# Patient Record
Sex: FEMALE | Race: WHITE | Employment: FULL TIME | URBAN - METROPOLITAN AREA
[De-identification: names, ages, dates, MRNs, and addresses within clinical notes are randomized per-mention and may not be internally consistent; named-entity substitution may affect disease eponyms.]

---

## 2018-08-27 ENCOUNTER — OFFICE VISIT (OUTPATIENT)
Dept: INTERNAL MEDICINE CLINIC | Facility: CLINIC | Age: 23
End: 2018-08-27

## 2018-08-27 VITALS
BODY MASS INDEX: 18.93 KG/M2 | SYSTOLIC BLOOD PRESSURE: 109 MMHG | OXYGEN SATURATION: 98 % | WEIGHT: 124.9 LBS | TEMPERATURE: 98.3 F | HEART RATE: 68 BPM | HEIGHT: 68 IN | RESPIRATION RATE: 17 BRPM | DIASTOLIC BLOOD PRESSURE: 66 MMHG

## 2018-08-27 DIAGNOSIS — L29.8 CHRONIC PRURITIC RASH IN ADULT: Primary | ICD-10-CM

## 2018-08-27 DIAGNOSIS — M79.671 PAIN IN BOTH FEET: ICD-10-CM

## 2018-08-27 DIAGNOSIS — D22.9 BENIGN MOLE: ICD-10-CM

## 2018-08-27 DIAGNOSIS — M79.672 PAIN IN BOTH FEET: ICD-10-CM

## 2018-08-27 NOTE — PROGRESS NOTES
Health Maintenance Due   Topic Date Due    HPV Age 9Y-34Y (1 of 1 - Female 3 Dose Series) 10/09/2004    DTaP/Tdap/Td series (1 - Tdap) 10/09/2014    PAP AKA CERVICAL CYTOLOGY  10/09/2014    Influenza Age 5 to Adult  08/01/2018       Chief Complaint   Patient presents with   [place] [place] Establish Care    Insect Bite     x 1 year    Foot Pain       1. Have you been to the ER, urgent care clinic since your last visit? Hospitalized since your last visit? No    2. Have you seen or consulted any other health care providers outside of the [address] [place] [address] since your last visit? Include any pap smears or colon screening. No    3) Do you have an Advance Directive on file? no    4) Are you interested in receiving information on Advance Directives? NO      Patient is accompanied by self I have received verbal consent from Lolly Wallace to discuss any/all medical information while they are present in the room.

## 2018-08-27 NOTE — PROGRESS NOTES
Subjective:      Princess Dexter is a 23 y.o. female who is a new patient and is here to establish care and concerns. The following sections were reviewed & updated as appropriate: PMH, PL, PSH, FH, RxH, and SH. Insect bite: noted over a year ago, she would get \"rows of red bumps\" that will be in random places. Symptoms would last a week. She states her  does not get bites. She has tried taking benadryl and states it doesn't help. She will not get symptoms when she is traveling. Symptoms will also go a month at a time without new ones. 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 She notes that she has pain every time she wears them and then pain resolves when she takes them off. She has worn Haix in the past.     There are no active problems to display for this patient. No Known Allergies  Past Medical History:   Diagnosis Date    Anemia      Past Surgical History:   Procedure Laterality Date    HX SHOULDER ARTHROSCOPY          Review of Systems    A comprehensive review of systems was negative except for that written in the HPI. Objective:     Visit Vitals    /66 (BP 1 Location: Left arm, BP Patient Position: Sitting)    Pulse 68    Temp 98.3 °F (36.8 °C) (Oral)    Resp 17    Ht 5' 8\" (1.727 m)    Wt 124 lb 14.4 oz (56.7 kg)    SpO2 98%    BMI 18.99 kg/m2     General appearance: alert, cooperative, no distress, appears stated age  Head: Normocephalic, without obvious abnormality, atraumatic  Eyes: negative  Lungs: clear to auscultation bilaterally  Heart: regular rate and rhythm, S1, S2 normal, no murmur, click, rub or gallop  Extremities: extremities normal, atraumatic, no cyanosis or edema  Pulses: 2+ and symmetric  Skin: Skin color, texture, turgor normal. No rashes or lesions  Neurologic: Grossly normal  Nursing note and vitals reviewed  Assessment/Plan:       ICD-10-CM ICD-9-CM    1.  Chronic pruritic rash in adult L29.8 698.8 REFERRAL TO DERMATOLOGY   2. Pain in both feet M79.671 729.5     M79.672     3. Benign mole D22.9 216.9    She will trial zyrtec for 2 weeks to see if there is improvement. Referral placed for derm to do skin scrapping. Letter written for her work so she can get new shoes. Advised her to call back or return to office if symptoms worsen/change/persist.  Discussed expected course/resolution/complications of diagnosis in detail with patient. Medication risks/benefits/costs/interactions/alternatives discussed with patient. She was given an after visit summary which includes diagnoses, current medications, & vitals. She expressed understanding with the diagnosis and plan.

## 2018-08-27 NOTE — LETTER
NOTIFICATION TO WORK 
 
8/27/2018 10:15 AM 
 
Ms. Yaritza Jones [address] [id number] To Whom It May Concern: 
 
Yaritza Jones is currently under the care of Sunny Olivarez. She is unable to wear the uniform shoes provided for her as it gives her bilateral foot pain. She has worn Haix in the past and this has helped symptoms. I recommend that she change shoes to the Haix as it would be medically necessary to improve chronic foot pain. If there are questions or concerns please have the patient contact our office.  
 
 
 
Sincerely, 
 
 
Madeline Bailon NP

## 2018-08-27 NOTE — MR AVS SNAPSHOT
[address] [place] [address] 
[phone] Patient: Aren Hodge MRN: [id number] :1995 Visit Information Date & Time Provider Department Dept. Phone Encounter #  
 2018  9:45 AM Kenney Espinoza, [address] [place] [address] Internal Medicine [phone] [id number] Follow-up Instructions Return if symptoms worsen or fail to improve, for Schedule your annual physical with fasting labs. Upcoming Health Maintenance Date Due  
 HPV Age 9Y-34Y (1 of 3 - Female 3 Dose Series) 10/9/2004 DTaP/Tdap/Td series (1 - Tdap) 10/9/2014 PAP AKA CERVICAL CYTOLOGY 10/9/2014 Influenza Age 5 to Adult 2018 Allergies as of 2018  Review Complete On: 2018 By: Kenney Espinoza NP No Known Allergies Current Immunizations  Never Reviewed No immunizations on file. Not reviewed this visit You Were Diagnosed With   
  
 Codes Comments Chronic pruritic rash in adult    -  Primary ICD-10-CM: L29.8 ICD-9-CM: 698.8 Pain in both feet     ICD-10-CM: M79.671, M39.602 ICD-9-CM: 729.5 Vitals BP Pulse Temp Resp Height(growth percentile) Weight(growth percentile) 109/66 (BP 1 Location: Left arm, BP Patient Position: Sitting) 68 98.3 °F (36.8 °C) (Oral) 17 5' 8\" (1.727 m) 124 lb 14.4 oz (56.7 kg) SpO2 BMI Smoking Status 98% 18.99 kg/m2 Never Smoker Vitals History BMI and BSA Data Body Mass Index Body Surface Area  
 18.99 kg/m 2 1.65 m 2 Preferred Pharmacy Pharmacy Name Phone [id number] [place], [place] - [address] [place] [address] [phone] Your Updated Medication List  
  
   
This list is accurate as of 18 10:23 AM.  Always use your most recent med list.  
  
  
  
  
 INTRAUTERINE DEVICE (IUD) IU  
by IntraUTERine route. We Performed the Following REFERRAL TO DERMATOLOGY [REF19 Custom] Follow-up Instructions Return if symptoms worsen or fail to improve, for Schedule your annual physical with fasting labs. Referral Information Referral ID Referred By Referred To  
  
 [id number] SKIFF, Maurilio Glee, NP   
   [address] [place], [address] Phone: [phone] Fax: [phone] Visits Status Start Date End Date 1 New Request 8/27/18 8/27/19 If your referral has a status of pending review or denied, additional information will be sent to support the outcome of this decision. Introducing [facility]! [employer] introduces [employer] patient portal. Now you can access parts of your medical record, email your doctor's office, and request medication refills online. 1. In your internet browser, go to [email or web address] 2. Click on the First Time User? Click Here link in the Sign In box. You will see the New Member Sign Up page. 3. Enter your [employer] Access Code exactly as it appears below. You will not need to use this code after youve completed the sign-up process. If you do not sign up before the expiration date, you must request a new code. · [employer] Access Code: [email or web address] Expires: 11/25/2018  9:39 AM 
 
4. Enter the last four digits of your Social Security Number (xxxx) and Date of Birth (mm/dd/yyyy) as indicated and click Submit. You will be taken to the next sign-up page. 5. Create a Bookeent ID. This will be your [employer] login ID and cannot be changed, so think of one that is secure and easy to remember. 6. Create a [employer] password. You can change your password at any time. 7. Enter your Password Reset Question and Answer. This can be used at a later time if you forget your password. 8. Enter your e-mail address. You will receive e-mail notification when new information is available in [address] 9. Click Sign Up. You can now view and download portions of your medical record. 10. Click the Download Summary menu link to download a portable copy of your medical information. If you have questions, please visit the Frequently Asked Questions section of the [employer] website. Remember, [employer] is NOT to be used for urgent needs. For medical emergencies, dial 911. Now available from your iPhone and Android! Please provide this summary of care documentation to your next provider. Your primary care clinician is listed as Colt Pratt. If you have any questions after today's visit, please call [phone].

## 2020-03-16 ENCOUNTER — OFFICE VISIT (OUTPATIENT)
Dept: INTERNAL MEDICINE CLINIC | Age: 25
End: 2020-03-16

## 2020-03-16 VITALS
BODY MASS INDEX: 19.04 KG/M2 | RESPIRATION RATE: 14 BRPM | SYSTOLIC BLOOD PRESSURE: 90 MMHG | DIASTOLIC BLOOD PRESSURE: 70 MMHG | WEIGHT: 125.6 LBS | HEART RATE: 61 BPM | TEMPERATURE: 98 F | HEIGHT: 68 IN | OXYGEN SATURATION: 98 %

## 2020-03-16 DIAGNOSIS — Z11.1 SCREENING-PULMONARY TB: ICD-10-CM

## 2020-03-16 DIAGNOSIS — Z00.00 ENCOUNTER FOR GENERAL ADULT MEDICAL EXAMINATION W/O ABNORMAL FINDINGS: Primary | ICD-10-CM

## 2020-03-16 DIAGNOSIS — I49.3 PVC (PREMATURE VENTRICULAR CONTRACTION): ICD-10-CM

## 2020-03-16 NOTE — PROGRESS NOTES
Subjective:      Janessa Harris is a 30 y.o. female who presents today for CPE. She needs forms completed for medical school. Immunization completed. Health Maintenance  Immunizations:    Influenza: up to date. Tetanus: up to date. Gardasil: up to date. Cancer screening:   Cervical: reviewed guidelines, UTD, done by OBGYN, records requested. 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 She ran an EKG on herself that showed PVCs, reviewed rhythm strip. She denies symptoms at this time. She specifically denies chest pain, SOB, dyspnea with exertion, peripheral edema. Patient Care Team:  Skiff, Colette Breed, NP as PCP - General (Nurse Practitioner)  Ariel Mueller NP as PCP - [facility] Provider     The following sections were reviewed & updated as appropriate: PMH, PSH, FH, and SH. Patient Active Problem List    Diagnosis Date Noted    Chronic pruritic rash in adult 08/27/2018     Current Outpatient Medications   Medication Sig Dispense Refill    INTRAUTERINE DEVICE, IUD, IU by IntraUTERine route. No Known Allergies  Past Medical History:   Diagnosis Date    Anemia      Past Surgical History:   Procedure Laterality Date    HX SHOULDER ARTHROSCOPY          Review of Systems    A comprehensive review of systems was negative except for that written in the HPI. Objective:     Visit Vitals  BP 90/70 (BP 1 Location: Left arm, BP Patient Position: Sitting)   Pulse 61   Temp 98 °F (36.7 °C) (Oral)   Resp 14   Ht 5' 8\" (1.727 m)   Wt 125 lb 9.6 oz (57 kg)   LMP 03/02/2020 (Approximate)   SpO2 98%   BMI 19.10 kg/m²     General:  Alert, cooperative, no distress, appears stated age. Head:  Normocephalic, without obvious abnormality, atraumatic. Eyes:  Conjunctivae/corneas clear. PERRL, EOMs intact   Ears:  Normal TMs and external ear canals both ears. Nose: Nares normal. Septum midline. Mucosa normal. No drainage or sinus tenderness. Throat: Lips, mucosa, and tongue normal. Teeth and gums normal.   Neck: Supple, symmetrical, trachea midline, no adenopathy, thyroid: no enlargement/tenderness/nodules, no carotid bruit and no JVD. Back:   Symmetric, no curvature. ROM normal. No CVA tenderness. Lungs:   Clear to auscultation bilaterally. Chest wall:  No tenderness or deformity. Heart:  Regular rate and rhythm, S1, S2 normal, no murmur, click, rub or gallop. Abdomen:   Soft, non-tender. Bowel sounds normal. No masses,  No organomegaly. Extremities: Extremities normal, atraumatic, no cyanosis or edema. Pulses: 2+ and symmetric all extremities. Skin: Skin color, texture, turgor normal. No rashes or lesions. Lymph nodes: Cervical, supraclavicular, and axillary nodes normal.   Neurologic: CNII-XII intact. Normal strength, sensation and reflexes throughout. Nursing note and vitals reviewed  Assessment/Plan:   1. Encounter for general adult medical examination w/o abnormal findings  - CBC WITH AUTOMATED DIFF  - LIPID PANEL  - METABOLIC PANEL, COMPREHENSIVE  - TSH 3RD GENERATION    2. PVC (premature ventricular contraction)  - REFERRAL TO CARDIOLOGY    3. Screening-pulmonary TB  - QUANTIFERON-TB GOLD PLUS            Advised her to call back or return to office if symptoms worsen/change/persist.  Discussed expected course/resolution/complications of diagnosis in detail with patient. Medication risks/benefits/costs/interactions/alternatives discussed with patient. She was given an after visit summary which includes diagnoses, current medications, & vitals. She expressed understanding with the diagnosis and plan.

## 2020-03-16 NOTE — PROGRESS NOTES
Chief Complaint   Patient presents with    Physical     Reviewed record in preparation for visit and have obtained necessary documentation. Identified pt with two pt identifiers(name and ). Health Maintenance Due   Topic    HPV Age 9Y-34Y (1 - 2-dose series)    DTaP/Tdap/Td series (1 - Tdap)    PAP AKA CERVICAL CYTOLOGY     Influenza Age 5 to Adult          Chief Complaint   Patient presents with    Physical        Wt Readings from Last 3 Encounters:   20 125 lb 9.6 oz (57 kg)   18 124 lb 14.4 oz (56.7 kg)     Temp Readings from Last 3 Encounters:   20 98 °F (36.7 °C) (Oral)   18 98.3 °F (36.8 °C) (Oral)     BP Readings from Last 3 Encounters:   20 90/70   18 109/66     Pulse Readings from Last 3 Encounters:   20 61   18 68           Learning Assessment:  :     Learning Assessment 2018   PRIMARY LEARNER Patient   PRIMARY LANGUAGE ENGLISH   LEARNER PREFERENCE PRIMARY DEMONSTRATION   ANSWERED BY Self   RELATIONSHIP SELF       Depression Screening:  :     3 most recent PHQ Screens 3/16/2020   Little interest or pleasure in doing things Not at all   Feeling down, depressed, irritable, or hopeless Not at all   Total Score PHQ 2 0       Fall Risk Assessment:  :     Fall Risk Assessment, last 12 mths 2018   Able to walk? Yes   Fall in past 12 months? No       Abuse Screening:  :     Abuse Screening Questionnaire 2018   Do you ever feel afraid of your partner? N   Are you in a relationship with someone who physically or mentally threatens you? N   Is it safe for you to go home?  Y       Coordination of Care Questionnaire:  :     1) Have you been to an emergency room, urgent care clinic since your last visit? no   Hospitalized since your last visit? no             2) Have you seen or consulted any other health care providers outside of [address] [place] [address] since your last visit? no  (Include any pap smears or colon screenings in this section.)    3) Do you have an Advance Directive on file? no    4) Are you interested in receiving information on Advance Directives? NO      Patient is accompanied by self I have received verbal consent from Mayela Berger to discuss any/all medical information while they are present in the room. Reviewed record  In preparation for visit and have obtained necessary documentation.

## 2020-03-18 LAB
ALBUMIN SERPL-MCNC: 4.8 G/DL (ref 3.9–5)
ALBUMIN/GLOB SERPL: 1.8 {RATIO} (ref 1.2–2.2)
ALP SERPL-CCNC: 58 IU/L (ref 39–117)
ALT SERPL-CCNC: 15 IU/L (ref 0–32)
AST SERPL-CCNC: 18 IU/L (ref 0–40)
BASOPHILS # BLD AUTO: 0.1 X10E3/UL (ref 0–0.2)
BASOPHILS NFR BLD AUTO: 1 %
BILIRUB SERPL-MCNC: 0.7 MG/DL (ref 0–1.2)
BUN SERPL-MCNC: 9 MG/DL (ref 6–20)
BUN/CREAT SERPL: 11 (ref 9–23)
CALCIUM SERPL-MCNC: 9.9 MG/DL (ref 8.7–10.2)
CHLORIDE SERPL-SCNC: 100 MMOL/L (ref 96–106)
CHOLEST SERPL-MCNC: 138 MG/DL (ref 100–199)
CO2 SERPL-SCNC: 24 MMOL/L (ref 20–29)
CREAT SERPL-MCNC: 0.81 MG/DL (ref 0.57–1)
EOSINOPHIL # BLD AUTO: 0.2 X10E3/UL (ref 0–0.4)
EOSINOPHIL NFR BLD AUTO: 2 %
ERYTHROCYTE [DISTWIDTH] IN BLOOD BY AUTOMATED COUNT: 12.7 % (ref 11.7–15.4)
GLOBULIN SER CALC-MCNC: 2.6 G/DL (ref 1.5–4.5)
GLUCOSE SERPL-MCNC: 87 MG/DL (ref 65–99)
HCT VFR BLD AUTO: 40.8 % (ref 34–46.6)
HDLC SERPL-MCNC: 45 MG/DL
HGB BLD-MCNC: 14.3 G/DL (ref 11.1–15.9)
IMM GRANULOCYTES # BLD AUTO: 0 X10E3/UL (ref 0–0.1)
IMM GRANULOCYTES NFR BLD AUTO: 0 %
LDLC SERPL CALC-MCNC: 81 MG/DL (ref 0–99)
LYMPHOCYTES # BLD AUTO: 3.4 X10E3/UL (ref 0.7–3.1)
LYMPHOCYTES NFR BLD AUTO: 37 %
MCH RBC QN AUTO: 29.5 PG (ref 26.6–33)
MCHC RBC AUTO-ENTMCNC: 35 G/DL (ref 31.5–35.7)
MCV RBC AUTO: 84 FL (ref 79–97)
MONOCYTES # BLD AUTO: 0.5 X10E3/UL (ref 0.1–0.9)
MONOCYTES NFR BLD AUTO: 6 %
NEUTROPHILS # BLD AUTO: 4.8 X10E3/UL (ref 1.4–7)
NEUTROPHILS NFR BLD AUTO: 54 %
PLATELET # BLD AUTO: 367 X10E3/UL (ref 150–450)
POTASSIUM SERPL-SCNC: 4.3 MMOL/L (ref 3.5–5.2)
PROT SERPL-MCNC: 7.4 G/DL (ref 6–8.5)
RBC # BLD AUTO: 4.85 X10E6/UL (ref 3.77–5.28)
SODIUM SERPL-SCNC: 140 MMOL/L (ref 134–144)
TRIGL SERPL-MCNC: 62 MG/DL (ref 0–149)
TSH SERPL DL<=0.005 MIU/L-ACNC: 0.66 UIU/ML (ref 0.45–4.5)
VLDLC SERPL CALC-MCNC: 12 MG/DL (ref 5–40)
WBC # BLD AUTO: 9 X10E3/UL (ref 3.4–10.8)

## 2020-03-18 NOTE — PROGRESS NOTES
All labs normal except slight elevation in lymphocytes. This is very mild and no further action needed at this time.  Thyroid testing was normal.

## 2020-03-19 DIAGNOSIS — R76.12 POSITIVE QUANTIFERON-TB GOLD TEST: Primary | ICD-10-CM

## 2020-03-19 LAB
GAMMA INTERFERON BACKGROUND BLD IA-ACNC: 0.66 IU/ML
M TB IFN-G BLD-IMP: POSITIVE
M TB IFN-G CD4+ BCKGRND COR BLD-ACNC: 1.04 IU/ML
MITOGEN IGNF BLD-ACNC: >10 IU/ML
QUANTIFERON INCUBATION, QF1T: ABNORMAL
QUANTIFERON TB2 AG: 0.74 IU/ML
SERVICE CMNT-IMP: ABNORMAL

## 2020-03-20 ENCOUNTER — TELEPHONE (OUTPATIENT)
Dept: INTERNAL MEDICINE CLINIC | Age: 25
End: 2020-03-20

## 2020-03-20 ENCOUNTER — HOSPITAL ENCOUNTER (OUTPATIENT)
Dept: GENERAL RADIOLOGY | Age: 25
Discharge: HOME | End: 2020-03-20
Attending: NURSE PRACTITIONER
Payer: COMMERCIAL

## 2020-03-20 DIAGNOSIS — R76.12 POSITIVE QUANTIFERON-TB GOLD TEST: ICD-10-CM

## 2020-03-20 PROCEDURE — 71046 X-RAY EXAM CHEST 2 VIEWS: CPT

## 2020-03-20 NOTE — PROGRESS NOTES
Unable to contact patient at this time. Only phone number not in service. Left message for emergency contact Cherry Carmona to have pt return my call.

## 2020-03-20 NOTE — TELEPHONE ENCOUNTER
Johana Mcmillan, patient returned your call. The phone number we had in her chart was incorrect -- 2 of the numbers were transposed. The correct number is:    [phone]    I changed the number in [address], but don't know how to do it in [address] [facility]. Please return call.

## 2020-03-23 ENCOUNTER — PATIENT MESSAGE (OUTPATIENT)
Dept: INTERNAL MEDICINE CLINIC | Age: 25
End: 2020-03-23

## 2020-03-23 DIAGNOSIS — Z22.7 TB LUNG, LATENT: ICD-10-CM

## 2020-03-23 DIAGNOSIS — Z22.7 TB LUNG, LATENT: Primary | ICD-10-CM

## 2020-03-23 RX ORDER — RIFAMPIN 300 MG/1
600 CAPSULE ORAL
Qty: 60 CAP | Refills: 3 | Status: SHIPPED | OUTPATIENT
Start: 2020-03-23

## 2020-03-23 RX ORDER — RIFAMPIN 300 MG/1
600 CAPSULE ORAL
Qty: 60 CAP | Refills: 3 | Status: SHIPPED | OUTPATIENT
Start: 2020-03-23 | End: 2020-03-23 | Stop reason: SDUPTHER

## 2020-03-23 NOTE — TELEPHONE ENCOUNTER
Subjective:      Nicole Jaramillo is a 30 y.o. female who presents today via 24/7 for management of latent TB. Latent TB: IGFR positive, CXR was negative. She denies chest pain, SOB, cough, wheeze, fever at this time. She had body aches a few days ago with fevers and was getting over being ill, she states this has resolved. 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 Patient Active Problem List    Diagnosis Date Noted    Positive QuantiFERON-TB Gold test 03/19/2020    Chronic pruritic rash in adult 08/27/2018     Current Outpatient Medications   Medication Sig Dispense Refill    INTRAUTERINE DEVICE, IUD, IU by IntraUTERine route. No Known Allergies  Past Medical History:   Diagnosis Date    Anemia      Past Surgical History:   Procedure Laterality Date    HX SHOULDER ARTHROSCOPY          Review of Systems    A comprehensive review of systems was negative except for that written in the HPI. Objective:       General appearance: alert, cooperative, no distress, appears stated age  Head: Normocephalic, without obvious abnormality, atraumatic  Neurologic: Grossly normal  Respiratory: normal respiratory   Psych: appropriate mood, speech, affect    Nursing note and vitals reviewed  Assessment/Plan:     1. TB lung, latent  - CXR clear, she was recently in for CPE and lungs were clear. She will get LFT done in 1 month and use the mychart  - rifAMPin (RIFADIN) 300 mg capsule; Take 2 Caps by mouth Daily (before breakfast). Dispense: 60 Cap; Refill: 3  - HEPATIC FUNCTION PANEL; Future    No orders of the defined types were placed in this encounter. Advised her to call back or return to office if symptoms worsen/change/persist.  Discussed expected course/resolution/complications of diagnosis in detail with patient. Medication risks/benefits/costs/interactions/alternatives discussed with patient.   She was given an after visit summary which includes diagnoses, current medications, & vitals. She expressed understanding with the diagnosis and plan.

## 2020-03-27 ENCOUNTER — TELEPHONE (OUTPATIENT)
Dept: CARDIOLOGY CLINIC | Age: 25
End: 2020-03-27

## 2020-03-27 NOTE — TELEPHONE ENCOUNTER
----- Message from Song Hilton NP sent at 3/27/2020  8:46 AM EDT -----  Regarding: appt for monday  Please put her on for a new patient/video visit with Dr. Scott Strong on Monday morning  Patient wants video visit    Kenna Osler     [facility] for patient to return call at earliest convenience. My chart message sent to patient. Patient returned call, 2 pt identifiers used    Advised her to review my chart message. She is scheduled for a virtual visit.    Future Appointments   Date Time Provider Nicole Rose   3/30/2020  1:00 PM Eyal Mulligan  [address]

## 2020-03-30 ENCOUNTER — VIRTUAL VISIT (OUTPATIENT)
Dept: CARDIOLOGY CLINIC | Age: 25
End: 2020-03-30

## 2020-03-30 VITALS
DIASTOLIC BLOOD PRESSURE: 62 MMHG | OXYGEN SATURATION: 98 % | HEART RATE: 64 BPM | RESPIRATION RATE: 16 BRPM | WEIGHT: 125 LBS | HEIGHT: 68 IN | SYSTOLIC BLOOD PRESSURE: 109 MMHG | BODY MASS INDEX: 18.94 KG/M2

## 2020-03-30 DIAGNOSIS — R00.2 PALPITATIONS: Primary | ICD-10-CM

## 2020-03-30 RX ORDER — CALCIUM CARBONATE 300MG(750)
1 TABLET,CHEWABLE ORAL DAILY
Qty: 90 TAB | Refills: 3 | Status: SHIPPED | OUTPATIENT
Start: 2020-03-30

## 2020-03-30 NOTE — PROGRESS NOTES
[facility] DISTRICT: Tonya Daily  [phone]  Requesting/referring provider: Maggy Aranda NP  Reason for Consult: palpitations    HPI: Nena Muro, a 30y.o. year-old who presents for evaluation of palpitations, stating  A month ago, laying in bed trying to go to sleep and feeling her heart racing. Pulse not fast but it felt weird. Irregular. 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 Since then she has a short few minute burst here and there but not lasting a super long time. Has had days wehre it happened on and off all day. Thinks she had as many as 10/min At times trigemeny and sometime every 5-6 beats. She has been very active, and athletic and no sx. No chest pain, no dizziness. Minimal caffeine one 16 ox coffee a dy. No etoh no nicotine    Assessment/Plan:  1. Palpitations- as above, increased frequency and severity. 2. PVC/ v-trigemeny- no high risk features  -will defer testing unless sx worsen  -trial of magoxide 400mg daily  -dilt 120mg CD if you need it. FHx gm with cad maybe, mother is ok dad is ok  Soc no tob no etoh, works as a paramedic  She  has a past medical history of Anemia. She also has no past medical history of Arrhythmia, Arthritis, Asthma, Autoimmune disease ([address] CAD (coronary artery disease), Calculus of kidney, Cancer ([address] Chronic kidney disease, Chronic obstructive pulmonary disease ([address] Chronic pain, Congestive heart failure ([address] Depression, Diabetes ([address] GERD (gastroesophageal reflux disease), Headache, Hypercholesterolemia, Hypertension, Liver disease, Psychotic disorder ([address] PUD (peptic ulcer disease), Seizures ([address] Stroke ([address] Thromboembolus ([address] Thyroid disease, or Trauma.     Cardiovascular ROS: no chest pain or dyspnea on exertion  Respiratory ROS: no cough, shortness of breath, or wheezing  Neurological ROS: no TIA or stroke symptoms  All other systems negative except as above. PE  Vitals:    03/30/20 1303   BP: 109/62   Pulse: 64   Resp: 16   SpO2: 98%   Weight: 125 lb (56.7 kg)   Height: 5' 8\" (1.727 m)    Body mass index is 19.01 kg/m². General appearance - alert, well appearing, and in no distress  Mental status - affect appropriate to mood  Eyes - sclera anicteric, moist mucous membranes  Chest - no wheezes, rales or rhonchi  Heart - normal rate, regular rhythm, normal S1, S2, no murmurs, rubs, clicks or gallops  Skin - normal coloration  no rashes    Recent Labs:  Lab Results   Component Value Date/Time    Cholesterol, total 138 03/17/2020 02:39 PM    HDL Cholesterol 45 03/17/2020 02:39 PM    LDL, calculated 81 03/17/2020 02:39 PM    Triglyceride 62 03/17/2020 02:39 PM     Lab Results   Component Value Date/Time    Creatinine 0.81 03/17/2020 02:39 PM     Lab Results   Component Value Date/Time    BUN 9 03/17/2020 02:39 PM     Lab Results   Component Value Date/Time    Potassium 4.3 03/17/2020 02:39 PM     No results found for: HBA1C, HGBE8, ELC1JRAL  Lab Results   Component Value Date/Time    HGB 14.3 03/17/2020 02:39 PM     Lab Results   Component Value Date/Time    PLATELET 997 46/87/6620 02:39 PM       Reviewed:  Past Medical History:   Diagnosis Date    Anemia      Social History     Tobacco Use   Smoking Status Never Smoker   Smokeless Tobacco Never Used     Social History     Substance and Sexual Activity   Alcohol Use Not Currently     No Known Allergies    Current Outpatient Medications   Medication Sig    rifAMPin (RIFADIN) 300 mg capsule Take 2 Caps by mouth Daily (before breakfast).  INTRAUTERINE DEVICE, IUD, IU by IntraUTERine route. No current facility-administered medications for this visit.         Minh Farris MD  [facility] heart and V[facility] [place]  [address], [address], [address] [place] [address]          VIRTUAL VISIT DOCUMENTATION     Pursuant to the emergency declaration under the [facility], [facility] waiver authority and the Coronavirus Preparedness and [employer] Act, this Virtual  Visit was conducted, with patient's consent, to reduce the patient's risk of exposure to COVID-19 and provide continuity of care for an established patient. Services were provided through a video synchronous discussion virtually to substitute for in-person clinic visit. CHIEF COMPLAINT      Ezio Christina is a 30 y.o. female who was seen by synchronous (real-time) audio-video technology on 3/30/2020. Patient is being seen today for palpitations     ASSESSMENT      PLAN       We discussed the expected course, resolution and complications of the diagnosis(es) in detail. Medication risks, benefits, costs, interactions, and alternatives were discussed as indicated. I advised her to contact the office if her condition worsens, changes or fails to improve as anticipated. She expressed understanding with the diagnosis(es) and plan    HISTORY OF PRESENTING ILLNESS      Ezio Christina is a 30 y.o. female        ACTIVE PROBLEM LIST     Patient Active Problem List    Diagnosis Date Noted    Positive QuantiFERON-TB Gold test 03/19/2020    Chronic pruritic rash in adult 08/27/2018           PAST MEDICAL HISTORY     Past Medical History:   Diagnosis Date    Anemia            PAST SURGICAL HISTORY     Past Surgical History:   Procedure Laterality Date    HX SHOULDER ARTHROSCOPY            ALLERGIES     No Known Allergies       FAMILY HISTORY     No family history on file.  negative for cardiac disease       SOCIAL HISTORY     Social History     Socioeconomic History    Marital status:      Spouse name: Not on file    Number of children: Not on file    Years of education: Not on file    Highest education level: Not on file   Tobacco Use  Smoking status: Never Smoker    Smokeless tobacco: Never Used   Substance and Sexual Activity    Alcohol use: Not Currently    Drug use: No    Sexual activity: Yes     Partners: Male         MEDICATIONS     Current Outpatient Medications   Medication Sig    rifAMPin (RIFADIN) 300 mg capsule Take 2 Caps by mouth Daily (before breakfast).  INTRAUTERINE DEVICE, IUD, IU by IntraUTERine route. No current facility-administered medications for this visit. I have reviewed the nurses notes, vitals, problem list, allergy list, medical history, family, social history and medications. REVIEW OF SYMPTOMS     Constitutional: Negative for fever, chills, malaise/fatigue and diaphoresis. Respiratory: Negative for cough, hemoptysis, sputum production, shortness of breath and wheezing. Cardiovascular: Negative for chest pain, palpitations, orthopnea, claudication, leg swelling and PND. Gastrointestinal: Negative for heartburn, nausea, vomiting, blood in stool and melena. Genitourinary: Negative for dysuria and flank pain. Musculoskeletal: Negative for joint pain and back pain. Skin: Negative for rash. Neurological: Negative for focal weakness, seizures, loss of consciousness, weakness and headaches. Endo/Heme/Allergies: Negative for abnormal bleeding. Psychiatric/Behavioral: Negative for memory loss. PHYSICAL EXAMINATION      Due to this being a TeleHealth evaluation, many elements of the physical examination are unable to be assessed. General: Well developed, in no acute distress, cooperative and alert  HEENT: Pupils equal/round. No marked JVD visible on video. Respiratory: No audible wheezing, no signs of respiratory distress, lips non cyanotic  Extremities:  No edema  Neuro: A&Ox3, speech clear, no facial droop, answering questions appropriately  Skin: Skin color is normal. No rashes or lesions.  Non diaphoretic on visible skin during exam       DIAGNOSTIC DATA      No specialty comments available. LABORATORY DATA      Lab Results   Component Value Date/Time    WBC 9.0 03/17/2020 02:39 PM    HGB 14.3 03/17/2020 02:39 PM    HCT 40.8 03/17/2020 02:39 PM    PLATELET 146 10/02/1345 02:39 PM    MCV 84 03/17/2020 02:39 PM      Lab Results   Component Value Date/Time    Sodium 140 03/17/2020 02:39 PM    Potassium 4.3 03/17/2020 02:39 PM    Chloride 100 03/17/2020 02:39 PM    CO2 24 03/17/2020 02:39 PM    Glucose 87 03/17/2020 02:39 PM    BUN 9 03/17/2020 02:39 PM    Creatinine 0.81 03/17/2020 02:39 PM    BUN/Creatinine ratio 11 03/17/2020 02:39 PM    GFR est  03/17/2020 02:39 PM    GFR est non- 03/17/2020 02:39 PM    Calcium 9.9 03/17/2020 02:39 PM    Bilirubin, total 0.7 03/17/2020 02:39 PM    AST (SGOT) 18 03/17/2020 02:39 PM    Alk. phosphatase 58 03/17/2020 02:39 PM    Protein, total 7.4 03/17/2020 02:39 PM    Albumin 4.8 03/17/2020 02:39 PM    A-G Ratio 1.8 03/17/2020 02:39 PM    ALT (SGPT) 15 03/17/2020 02:39 PM             FOLLOW-UP            Patient was made aware and verbalized understanding that an appointment will be scheduled for them for a virtual visit and/or office visit within the above time frame. Patient understanding his/her responsibility to call and change time/date if he/she so chooses. Thank you, Zoë Pantoja NP for allowing me to participate in the care of Cj Crowder. Please do not hesitate to contact me for further questions/concerns. MD [address]  Cole Aguillon, [facility], [address] [place] [address], [address]  [phone] / [phone] Fax  [phone] / [phone] Fax        Greater than 20 minutes was spent in direct video patient care, planning and chart review. This visit was conducted using [employer] telemedicine services.

## 2020-03-30 NOTE — LETTER
3/30/2020 1:32 PM 
 
Ms. Yaritza Jones [address] [id number] The dose for magnesium is 400mg of Mag Oxide once a day. Thanks! Sincerely, Mauricio Gomes MD

## 2020-04-06 ENCOUNTER — TELEPHONE (OUTPATIENT)
Dept: CARDIOLOGY CLINIC | Age: 25
End: 2020-04-06

## 2020-04-06 NOTE — TELEPHONE ENCOUNTER
Returned patient's call, 2 pt identifiers used    Advised patient she needs to contact the billing office with questions regarding a bill. Provided patient with the contact information for billing.

## 2020-04-06 NOTE — TELEPHONE ENCOUNTER
Pt states on the claim it states she had a office visit and not a virtual visit. Pt was hoping to pay less by having a virtual visit.  Please advise      Phone:[phone]

## 2020-04-23 DIAGNOSIS — Z22.7 TB LUNG, LATENT: ICD-10-CM

## 2020-04-24 ENCOUNTER — PATIENT MESSAGE (OUTPATIENT)
Dept: INTERNAL MEDICINE CLINIC | Age: 25
End: 2020-04-24

## 2020-04-24 LAB
ALBUMIN SERPL-MCNC: 4.5 G/DL (ref 3.9–5)
ALP SERPL-CCNC: 68 IU/L (ref 39–117)
ALT SERPL-CCNC: 45 IU/L (ref 0–32)
AST SERPL-CCNC: 27 IU/L (ref 0–40)
BILIRUB DIRECT SERPL-MCNC: 0.23 MG/DL (ref 0–0.4)
BILIRUB SERPL-MCNC: 0.6 MG/DL (ref 0–1.2)
PROT SERPL-MCNC: 6.7 G/DL (ref 6–8.5)

## 2020-04-27 DIAGNOSIS — Z22.7 TB LUNG, LATENT: Primary | ICD-10-CM

## 2020-04-27 DIAGNOSIS — R79.89 ELEVATED LFTS: ICD-10-CM

## 2020-04-27 NOTE — PROGRESS NOTES
Slight elevation noted in ALT, will continue to monitor every 4 weeks.  Patient will alert office for any signs of hepatotoxicity

## 2020-04-30 DIAGNOSIS — R79.89 ELEVATED LFTS: Primary | ICD-10-CM

## 2020-05-22 DIAGNOSIS — R79.89 ELEVATED LFTS: ICD-10-CM

## 2020-05-22 DIAGNOSIS — Z22.7 TB LUNG, LATENT: ICD-10-CM

## 2020-05-27 LAB
ALBUMIN SERPL-MCNC: 4.7 G/DL (ref 3.9–5)
ALP SERPL-CCNC: 54 IU/L (ref 39–117)
ALT SERPL-CCNC: 20 IU/L (ref 0–32)
AST SERPL-CCNC: 27 IU/L (ref 0–40)
BILIRUB DIRECT SERPL-MCNC: 0.1 MG/DL (ref 0–0.4)
BILIRUB SERPL-MCNC: 0.2 MG/DL (ref 0–1.2)
PROT SERPL-MCNC: 7 G/DL (ref 6–8.5)

## 2020-07-21 LAB
ALBUMIN SERPL-MCNC: 4.6 G/DL (ref 3.9–5)
ALP SERPL-CCNC: 49 IU/L (ref 39–117)
ALT SERPL-CCNC: 22 IU/L (ref 0–32)
AST SERPL-CCNC: 20 IU/L (ref 0–40)
BILIRUB DIRECT SERPL-MCNC: 0.22 MG/DL (ref 0–0.4)
BILIRUB SERPL-MCNC: 0.6 MG/DL (ref 0–1.2)
PROT SERPL-MCNC: 6.8 G/DL (ref 6–8.5)

## 2020-07-22 DIAGNOSIS — Z22.7 TB LUNG, LATENT: Primary | ICD-10-CM

## 2020-07-27 LAB
GAMMA INTERFERON BACKGROUND BLD IA-ACNC: 0.29 IU/ML
M TB IFN-G BLD-IMP: NEGATIVE
M TB IFN-G CD4+ BCKGRND COR BLD-ACNC: 0.35 IU/ML
MITOGEN IGNF BLD-ACNC: >10 IU/ML
QUANTIFERON INCUBATION, QF1T: NORMAL
QUANTIFERON TB2 AG: 0.58 IU/ML
SERVICE CMNT-IMP: NORMAL

## 2022-03-18 PROBLEM — R79.89 ELEVATED LFTS: Status: ACTIVE | Noted: 2020-04-30

## 2022-03-19 PROBLEM — L29.8 CHRONIC PRURITIC RASH IN ADULT: Status: ACTIVE | Noted: 2018-08-27

## 2022-03-20 PROBLEM — R76.12 POSITIVE QUANTIFERON-TB GOLD TEST: Status: ACTIVE | Noted: 2020-03-19

## 2023-05-19 RX ORDER — MAGNESIUM OXIDE 400 MG/1
1 TABLET ORAL DAILY
COMMUNITY
Start: 2020-03-30

## 2023-05-19 RX ORDER — RIFAMPIN 300 MG/1
600 CAPSULE ORAL
COMMUNITY
Start: 2020-03-23